# Patient Record
Sex: FEMALE | URBAN - METROPOLITAN AREA
[De-identification: names, ages, dates, MRNs, and addresses within clinical notes are randomized per-mention and may not be internally consistent; named-entity substitution may affect disease eponyms.]

---

## 2021-11-23 ENCOUNTER — HOSPITAL ENCOUNTER (EMERGENCY)
Facility: CLINIC | Age: 36
Discharge: LEFT BEFORE DISCHARGE | End: 2021-11-23

## 2021-11-23 VITALS
SYSTOLIC BLOOD PRESSURE: 137 MMHG | RESPIRATION RATE: 16 BRPM | WEIGHT: 210 LBS | HEIGHT: 67 IN | HEART RATE: 85 BPM | OXYGEN SATURATION: 99 % | DIASTOLIC BLOOD PRESSURE: 77 MMHG | BODY MASS INDEX: 32.96 KG/M2 | TEMPERATURE: 98.2 F

## 2021-11-23 ASSESSMENT — MIFFLIN-ST. JEOR: SCORE: 1680.18

## 2021-11-24 NOTE — ED TRIAGE NOTES
Patient reached into  to removed knife and the tip of the knife caused a small laceration to her right middle finger. Bleeding is controlled.

## 2022-01-27 ENCOUNTER — MEDICAL CORRESPONDENCE (OUTPATIENT)
Dept: HEALTH INFORMATION MANAGEMENT | Facility: CLINIC | Age: 37
End: 2022-01-27
Payer: COMMERCIAL

## 2022-02-11 ENCOUNTER — TRANSFERRED RECORDS (OUTPATIENT)
Dept: HEALTH INFORMATION MANAGEMENT | Facility: CLINIC | Age: 37
End: 2022-02-11
Payer: COMMERCIAL

## 2022-02-15 NOTE — TELEPHONE ENCOUNTER
RECORDS RECEIVED FROM: External   Appt Date: 02.16.2022   NOTES STATUS DETAILS   OFFICE NOTE from referring provider Received  02.15.2022 Shannon Bird DNP [facility]   MEDICATION LIST Care Everywhere  Received     02.15.2022 [facility]   LABS     HEPATIC PANEL (LIVER PANEL) Care Everywhere 12.03.2021   BASIC METABOLIC PANEL Care Everywhere 12.03.2021   COMPLETE METABOLIC PANEL Care Everywhere 03.11.2021   COMPLETE BLOOD COUNT (CBC) Care Everywhere 03.11.2021   HEPATITIS C ANTIBODY Care Everywhere 09.02.2020   HEPATITIS B SURFACE ANTIGEN Care Everywhere 09.02.2020   HEPATITIS B SURFACE ANTIBODY Care Everywhere 09.02.2020     Action 02.15.2022 RM   Action Taken Pending for records     Action 02.15.2022 rm 2:30p   Action Taken Records received,  sent to HIM to be uploaded to chart.

## 2022-02-16 ENCOUNTER — VIRTUAL VISIT (OUTPATIENT)
Dept: GASTROENTEROLOGY | Facility: CLINIC | Age: 37
End: 2022-02-16
Attending: STUDENT IN AN ORGANIZED HEALTH CARE EDUCATION/TRAINING PROGRAM
Payer: COMMERCIAL

## 2022-02-16 ENCOUNTER — PRE VISIT (OUTPATIENT)
Dept: GASTROENTEROLOGY | Facility: CLINIC | Age: 37
End: 2022-02-16
Payer: COMMERCIAL

## 2022-02-16 DIAGNOSIS — F15.10 METHAMPHETAMINE USE (H): ICD-10-CM

## 2022-02-16 DIAGNOSIS — E66.9 OBESITY WITH SERIOUS COMORBIDITY, UNSPECIFIED CLASSIFICATION, UNSPECIFIED OBESITY TYPE: ICD-10-CM

## 2022-02-16 DIAGNOSIS — R79.89 ELEVATED LFTS: Primary | ICD-10-CM

## 2022-02-16 DIAGNOSIS — Z79.4 TYPE 2 DIABETES MELLITUS WITHOUT COMPLICATION, WITH LONG-TERM CURRENT USE OF INSULIN (H): ICD-10-CM

## 2022-02-16 DIAGNOSIS — E11.9 TYPE 2 DIABETES MELLITUS WITHOUT COMPLICATION, WITH LONG-TERM CURRENT USE OF INSULIN (H): ICD-10-CM

## 2022-02-16 PROCEDURE — 99204 OFFICE O/P NEW MOD 45 MIN: CPT | Mod: 95 | Performed by: STUDENT IN AN ORGANIZED HEALTH CARE EDUCATION/TRAINING PROGRAM

## 2022-02-16 RX ORDER — DULOXETIN HYDROCHLORIDE 20 MG/1
20 CAPSULE, DELAYED RELEASE ORAL
COMMUNITY
Start: 2020-11-18

## 2022-02-16 RX ORDER — ACETAMINOPHEN 500 MG
500 TABLET ORAL
COMMUNITY
Start: 2021-03-11

## 2022-02-16 RX ORDER — MULTIVITAMIN,THERAPEUTIC
1 TABLET ORAL DAILY
COMMUNITY

## 2022-02-16 RX ORDER — LIRAGLUTIDE 6 MG/ML
1.2 INJECTION SUBCUTANEOUS DAILY
COMMUNITY

## 2022-02-16 RX ORDER — QUETIAPINE FUMARATE 50 MG/1
50 TABLET, FILM COATED ORAL 2 TIMES DAILY
COMMUNITY

## 2022-02-16 RX ORDER — INSULIN GLARGINE 100 [IU]/ML
INJECTION, SOLUTION SUBCUTANEOUS AT BEDTIME
COMMUNITY

## 2022-02-16 RX ORDER — IBUPROFEN 600 MG/1
600 TABLET, FILM COATED ORAL
COMMUNITY
Start: 2020-09-05

## 2022-02-16 ASSESSMENT — PAIN SCALES - GENERAL: PAINLEVEL: NO PAIN (0)

## 2022-02-16 NOTE — LETTER
Date:February 20, 2022      Patient was self referred, no letter generated. Do not send.        [facility] Health Information

## 2022-02-16 NOTE — LETTER
2/16/2022         RE: Mary Lopez  [address]  [place] [address]        Dear Colleague,    Thank you for referring your patient, Mary Lopez, to the [facility] [place]. Please see a copy of my visit note below.    Mary is a 36 year old who is being evaluated via a billable video visit.      How would you like to obtain your AVS? MyChart  If the video visit is dropped, the invitation should be resent by: Text to cell phone: [phone]  Will anyone else be joining your video visit? No      Video Start Time: 12:32 PM  Video-Visit Details    Type of service:  Video Visit    Video End Time:12:42    Originating Location (pt. Location): Home    Distant Location (provider location):  [facility] [place]     Platform used for Video Visit: [employer]      [facility] New Patient Visit    Date of Visit: February 16, 2022    Reason for referral: Elevated LFTs    Subjective: Ms. Lopez is a 36-year-old woman with a history of obesity, diabetes, prior methamphetamine use who presents for evaluation of elevated liver enzymes    She thinks her liver enzymes were in 2013, did a MRI That was normal    Has a history of moderate liver enzyme elevation on reviewing copper wire, hepatitis C testing was negative in 2020.    She has a several day history of methamphetamine use, smokes methamphetamines.  Denies IV or intranasal drug use.  She has been sober for about 2 3 weeks.  She notes that since she has become sober she has lost about 40 pounds.  A1c has been elevated around the 10s, will started Victoza and is working with her lose weight again.  She is working with the diabetes educator at [facility].    No alcohol. No family history of liver disease    ROS: 14 point ROS negative except for positives noted in HPI.    PMHx:  DM - last A1c 10.9  Methamphetamine use  Vaginal delivery    PSHx:  [place] teeth removal    FamHx:  No family  history of liver disease, liver cancer    SocHx:  Social History     Socioeconomic History     Marital status: Patient Declined     Spouse name: Not on file     Number of children: Not on file     Years of education: Not on file     Highest education level: Not on file   Occupational History     Not on file   Tobacco Use     Smoking status: Former Smoker     Smokeless tobacco: Never Used   Substance and Sexual Activity     Alcohol use: Not Currently     Drug use: Not Currently     Sexual activity: Not on file   Other Topics Concern     Not on file   Social History Narrative     Not on file     Social Determinants of Health     Financial Resource Strain: Not on file   Food Insecurity: Not on file   Transportation Needs: Not on file   Physical Activity: Not on file   Stress: Not on file   Social Connections: Not on file   Intimate Partner Violence: Not on file   Housing Stability: Not on file       Medications:  Current Outpatient Medications   Medication     acetaminophen (TYLENOL) 500 MG tablet     DULoxetine (CYMBALTA) 20 MG capsule     ibuprofen (ADVIL/MOTRIN) 600 MG tablet     insulin glargine (LANTUS VIAL) 100 UNIT/ML vial     liraglutide (VICTOZA) 18 MG/3ML solution     multivitamin, therapeutic (THERA-VIT) TABS tablet     QUEtiapine (SEROQUEL) 50 MG tablet     Vitamin D3 (CHOLECALCIFEROL) 125 MCG (5000 UT) tablet     No current facility-administered medications for this visit.     No OTCs, herbals    Allergies:  Allergies   Allergen Reactions     Sulfa Drugs Angioedema       Objective:  There were no vitals taken for this visit.  Constitutional: pleasant woman in NAD  Eyes: non icteric  Respiratory: Normal respiratory excursion   MSK: normal range of motion of visualized extremities  Abd: Non distended  Skin: No jaundice  Psychiatric: normal mood and orientation    Labs:    12/2021  AST 74  total bilirubin 0.2 alkaline phosphatase 177     Imaging: No liver imaging for review    Independently reviewed  labs and imaging.     Assessment/Plan: Ms. Lopez is a 36-year-old woman with a history of diabetes, obesity, former methamphetamine use, who presents for evaluation of elevated liver enzymes.  Recommend checking again for hepatitis B and C.  She ass negative in the past and denies IV or intranasal drug use in the meantime. Feel her elevated liver enzymes are likely related to nonalcoholic fatty liver disease and poorly controlled diabetes, she is working on this currently.  She does not drink alcohol.    Discussed the natural history of fatty liver, risk for progression to fibrosis with continued obesity and poorly controlled diabetes.  She is already taking Victoza, which has some benefits and nonalcohol fatty liver disease.    Recommend right upper quadrant ultrasound to assess her liver parenchyma, and serologic work-up for other causes of elevated liver enzymes  Orders Placed This Encounter   Procedures     US Abdomen Limited     Comprehensive metabolic panel (BMP + Alb, Alk Phos, ALT, AST, Total. Bili, TP)     Hepatitis C RNA, quantitative     Hepatitis C antibody     Hepatitis Antibody A IgG     Hepatitis A antibody IgM     Hepatitis B surface antigen     Hepatitis B core antibody     IgG     F Actin EIA with reflex     Iron and iron binding capacity     Ferritin       RTC 6 months.    Daina Marinelli MD MS  Hepatology/Liver Transplant  [facility]            Again, thank you for allowing me to participate in the care of your patient.        Sincerely,        Daina Marinelli MD

## 2022-02-16 NOTE — PROGRESS NOTES
Mary is a 36 year old who is being evaluated via a billable video visit.      How would you like to obtain your AVS? MyChart  If the video visit is dropped, the invitation should be resent by: Text to cell phone: [phone]  Will anyone else be joining your video visit? No      Video Start Time: 12:32 PM  Video-Visit Details    Type of service:  Video Visit    Video End Time:12:42    Originating Location (pt. Location): Home    Distant Location (provider location):  [facility] [place]     Platform used for Video Visit: [employer]      [facility] New Patient Visit    Date of Visit: February 16, 2022    Reason for referral: Elevated LFTs    Subjective: Ms. Lopez is a 36-year-old woman with a history of obesity, diabetes, prior methamphetamine use who presents for evaluation of elevated liver enzymes    She thinks her liver enzymes were in 2013, did a MRI That was normal    Has a history of moderate liver enzyme elevation on reviewing copper wire, hepatitis C testing was negative in 2020.    She has a several day history of methamphetamine use, smokes methamphetamines.  Denies IV or intranasal drug use.  She has been sober for about 2 3 weeks.  She notes that since she has become sober she has lost about 40 pounds.  A1c has been elevated around the 10s, will started Victoza and is working with her lose weight again.  She is working with the diabetes educator at [facility].    No alcohol. No family history of liver disease    ROS: 14 point ROS negative except for positives noted in HPI.    PMHx:  DM - last A1c 10.9  Methamphetamine use  Vaginal delivery    PSHx:  [place] teeth removal    FamHx:  No family history of liver disease, liver cancer    SocHx:  Social History     Socioeconomic History     Marital status: Patient Declined     Spouse name: Not on file     Number of children: Not on file     Years of education: Not on file     Highest education level: Not on  file   Occupational History     Not on file   Tobacco Use     Smoking status: Former Smoker     Smokeless tobacco: Never Used   Substance and Sexual Activity     Alcohol use: Not Currently     Drug use: Not Currently     Sexual activity: Not on file   Other Topics Concern     Not on file   Social History Narrative     Not on file     Social Determinants of Health     Financial Resource Strain: Not on file   Food Insecurity: Not on file   Transportation Needs: Not on file   Physical Activity: Not on file   Stress: Not on file   Social Connections: Not on file   Intimate Partner Violence: Not on file   Housing Stability: Not on file       Medications:  Current Outpatient Medications   Medication     acetaminophen (TYLENOL) 500 MG tablet     DULoxetine (CYMBALTA) 20 MG capsule     ibuprofen (ADVIL/MOTRIN) 600 MG tablet     insulin glargine (LANTUS VIAL) 100 UNIT/ML vial     liraglutide (VICTOZA) 18 MG/3ML solution     multivitamin, therapeutic (THERA-VIT) TABS tablet     QUEtiapine (SEROQUEL) 50 MG tablet     Vitamin D3 (CHOLECALCIFEROL) 125 MCG (5000 UT) tablet     No current facility-administered medications for this visit.     No OTCs, herbals    Allergies:  Allergies   Allergen Reactions     Sulfa Drugs Angioedema       Objective:  There were no vitals taken for this visit.  Constitutional: pleasant woman in NAD  Eyes: non icteric  Respiratory: Normal respiratory excursion   MSK: normal range of motion of visualized extremities  Abd: Non distended  Skin: No jaundice  Psychiatric: normal mood and orientation    Labs:    12/2021  AST 74  total bilirubin 0.2 alkaline phosphatase 177     Imaging: No liver imaging for review    Independently reviewed labs and imaging.     Assessment/Plan: Ms. Lopez is a 36-year-old woman with a history of diabetes, obesity, former methamphetamine use, who presents for evaluation of elevated liver enzymes.  Recommend checking again for hepatitis B and C.  She ass negative in the  past and denies IV or intranasal drug use in the meantime. Feel her elevated liver enzymes are likely related to nonalcoholic fatty liver disease and poorly controlled diabetes, she is working on this currently.  She does not drink alcohol.    Discussed the natural history of fatty liver, risk for progression to fibrosis with continued obesity and poorly controlled diabetes.  She is already taking Victoza, which has some benefits and nonalcohol fatty liver disease.    Recommend right upper quadrant ultrasound to assess her liver parenchyma, and serologic work-up for other causes of elevated liver enzymes  Orders Placed This Encounter   Procedures     US Abdomen Limited     Comprehensive metabolic panel (BMP + Alb, Alk Phos, ALT, AST, Total. Bili, TP)     Hepatitis C RNA, quantitative     Hepatitis C antibody     Hepatitis Antibody A IgG     Hepatitis A antibody IgM     Hepatitis B surface antigen     Hepatitis B core antibody     IgG     F Actin EIA with reflex     Iron and iron binding capacity     Ferritin       RTC 6 months.    Daina Marinelli MD MS  Hepatology/Liver Transplant  [facility]

## 2024-11-11 ENCOUNTER — HOSPITAL ENCOUNTER (EMERGENCY)
Facility: CLINIC | Age: 39
Discharge: HOME | End: 2024-11-11
Attending: STUDENT IN AN ORGANIZED HEALTH CARE EDUCATION/TRAINING PROGRAM | Admitting: STUDENT IN AN ORGANIZED HEALTH CARE EDUCATION/TRAINING PROGRAM
Payer: COMMERCIAL

## 2024-11-11 VITALS
TEMPERATURE: 98.1 F | HEIGHT: 67 IN | OXYGEN SATURATION: 100 % | SYSTOLIC BLOOD PRESSURE: 115 MMHG | WEIGHT: 198.8 LBS | DIASTOLIC BLOOD PRESSURE: 75 MMHG | HEART RATE: 69 BPM | BODY MASS INDEX: 31.2 KG/M2 | RESPIRATION RATE: 16 BRPM

## 2024-11-11 DIAGNOSIS — B96.89 BACTERIAL VAGINOSIS: ICD-10-CM

## 2024-11-11 DIAGNOSIS — N76.0 BACTERIAL VAGINOSIS: ICD-10-CM

## 2024-11-11 DIAGNOSIS — N30.00 ACUTE CYSTITIS WITHOUT HEMATURIA: ICD-10-CM

## 2024-11-11 LAB
ALBUMIN SERPL BCG-MCNC: 4.1 G/DL (ref 3.5–5.2)
ALBUMIN UR-MCNC: 20 MG/DL
ALP SERPL-CCNC: 114 U/L (ref 40–150)
ALT SERPL W P-5'-P-CCNC: 18 U/L (ref 0–50)
ANION GAP SERPL CALCULATED.3IONS-SCNC: 13 MMOL/L (ref 7–15)
APPEARANCE UR: ABNORMAL
AST SERPL W P-5'-P-CCNC: 16 U/L (ref 0–45)
BASOPHILS # BLD AUTO: 0 10E3/UL (ref 0–0.2)
BASOPHILS NFR BLD AUTO: 0 %
BILIRUB SERPL-MCNC: <0.2 MG/DL
BILIRUB UR QL STRIP: NEGATIVE
BUN SERPL-MCNC: 23.9 MG/DL (ref 6–20)
CALCIUM SERPL-MCNC: 8.7 MG/DL (ref 8.8–10.4)
CHLORIDE SERPL-SCNC: 103 MMOL/L (ref 98–107)
CLUE CELLS: PRESENT
COLOR UR AUTO: YELLOW
CREAT SERPL-MCNC: 0.71 MG/DL (ref 0.51–0.95)
EGFRCR SERPLBLD CKD-EPI 2021: >90 ML/MIN/1.73M2
EOSINOPHIL # BLD AUTO: 0.2 10E3/UL (ref 0–0.7)
EOSINOPHIL NFR BLD AUTO: 3 %
ERYTHROCYTE [DISTWIDTH] IN BLOOD BY AUTOMATED COUNT: 12 % (ref 10–15)
GLUCOSE SERPL-MCNC: 157 MG/DL (ref 70–99)
GLUCOSE UR STRIP-MCNC: NEGATIVE MG/DL
HCG UR QL: NEGATIVE
HCO3 SERPL-SCNC: 22 MMOL/L (ref 22–29)
HCT VFR BLD AUTO: 37.4 % (ref 35–47)
HGB BLD-MCNC: 12.7 G/DL (ref 11.7–15.7)
HGB UR QL STRIP: NEGATIVE
IMM GRANULOCYTES # BLD: 0 10E3/UL
IMM GRANULOCYTES NFR BLD: 0 %
INTERNAL QC OK POCT: NORMAL
KETONES UR STRIP-MCNC: NEGATIVE MG/DL
LEUKOCYTE ESTERASE UR QL STRIP: ABNORMAL
LYMPHOCYTES # BLD AUTO: 2.5 10E3/UL (ref 0.8–5.3)
LYMPHOCYTES NFR BLD AUTO: 30 %
MCH RBC QN AUTO: 28.7 PG (ref 26.5–33)
MCHC RBC AUTO-ENTMCNC: 34 G/DL (ref 31.5–36.5)
MCV RBC AUTO: 84 FL (ref 78–100)
MONOCYTES # BLD AUTO: 0.6 10E3/UL (ref 0–1.3)
MONOCYTES NFR BLD AUTO: 8 %
MUCOUS THREADS #/AREA URNS LPF: PRESENT /LPF
NEUTROPHILS # BLD AUTO: 5 10E3/UL (ref 1.6–8.3)
NEUTROPHILS NFR BLD AUTO: 60 %
NITRATE UR QL: NEGATIVE
NRBC # BLD AUTO: 0 10E3/UL
NRBC BLD AUTO-RTO: 0 /100
PH UR STRIP: 5.5 [PH] (ref 5–7)
PLATELET # BLD AUTO: 211 10E3/UL (ref 150–450)
POCT KIT EXPIRATION DATE: NORMAL
POCT KIT LOT NUMBER: NORMAL
POTASSIUM SERPL-SCNC: 3.9 MMOL/L (ref 3.4–5.3)
PROT SERPL-MCNC: 7.3 G/DL (ref 6.4–8.3)
RBC # BLD AUTO: 4.43 10E6/UL (ref 3.8–5.2)
RBC URINE: 9 /HPF
RENAL TUB EPI: <1 /HPF
SODIUM SERPL-SCNC: 138 MMOL/L (ref 135–145)
SP GR UR STRIP: 1.03 (ref 1–1.03)
SQUAMOUS EPITHELIAL: 1 /HPF
TRANSITIONAL EPI: <1 /HPF
TRICHOMONAS, WET PREP: ABNORMAL
UROBILINOGEN UR STRIP-MCNC: NORMAL MG/DL
WBC # BLD AUTO: 8.4 10E3/UL (ref 4–11)
WBC URINE: >182 /HPF
WBC'S/HIGH POWER FIELD, WET PREP: ABNORMAL
YEAST, WET PREP: ABNORMAL

## 2024-11-11 PROCEDURE — 99284 EMERGENCY DEPT VISIT MOD MDM: CPT | Performed by: STUDENT IN AN ORGANIZED HEALTH CARE EDUCATION/TRAINING PROGRAM

## 2024-11-11 PROCEDURE — 87086 URINE CULTURE/COLONY COUNT: CPT | Performed by: STUDENT IN AN ORGANIZED HEALTH CARE EDUCATION/TRAINING PROGRAM

## 2024-11-11 PROCEDURE — 81003 URINALYSIS AUTO W/O SCOPE: CPT | Performed by: STUDENT IN AN ORGANIZED HEALTH CARE EDUCATION/TRAINING PROGRAM

## 2024-11-11 PROCEDURE — 81025 URINE PREGNANCY TEST: CPT | Performed by: STUDENT IN AN ORGANIZED HEALTH CARE EDUCATION/TRAINING PROGRAM

## 2024-11-11 PROCEDURE — 36415 COLL VENOUS BLD VENIPUNCTURE: CPT | Performed by: STUDENT IN AN ORGANIZED HEALTH CARE EDUCATION/TRAINING PROGRAM

## 2024-11-11 PROCEDURE — 87210 SMEAR WET MOUNT SALINE/INK: CPT | Performed by: STUDENT IN AN ORGANIZED HEALTH CARE EDUCATION/TRAINING PROGRAM

## 2024-11-11 PROCEDURE — 85025 COMPLETE CBC W/AUTO DIFF WBC: CPT | Performed by: STUDENT IN AN ORGANIZED HEALTH CARE EDUCATION/TRAINING PROGRAM

## 2024-11-11 PROCEDURE — 82040 ASSAY OF SERUM ALBUMIN: CPT | Performed by: STUDENT IN AN ORGANIZED HEALTH CARE EDUCATION/TRAINING PROGRAM

## 2024-11-11 RX ORDER — NITROFURANTOIN 25; 75 MG/1; MG/1
100 CAPSULE ORAL 2 TIMES DAILY
Qty: 10 CAPSULE | Refills: 0 | Status: SHIPPED | OUTPATIENT
Start: 2024-11-11 | End: 2024-11-16

## 2024-11-11 RX ORDER — CLINDAMYCIN PHOSPHATE 20 MG/G
1 CREAM VAGINAL AT BEDTIME
Qty: 40 G | Refills: 0 | Status: SHIPPED | OUTPATIENT
Start: 2024-11-11 | End: 2024-11-18

## 2024-11-11 ASSESSMENT — ACTIVITIES OF DAILY LIVING (ADL)
ADLS_ACUITY_SCORE: 0

## 2024-11-11 ASSESSMENT — COLUMBIA-SUICIDE SEVERITY RATING SCALE - C-SSRS
6. HAVE YOU EVER DONE ANYTHING, STARTED TO DO ANYTHING, OR PREPARED TO DO ANYTHING TO END YOUR LIFE?: NO
2. HAVE YOU ACTUALLY HAD ANY THOUGHTS OF KILLING YOURSELF IN THE PAST MONTH?: NO
1. IN THE PAST MONTH, HAVE YOU WISHED YOU WERE DEAD OR WISHED YOU COULD GO TO SLEEP AND NOT WAKE UP?: NO

## 2024-11-11 NOTE — ED TRIAGE NOTES
Triage Assessment (Adult)       Row Name 11/11/24 1758          Triage Assessment    Airway WDL WDL        Respiratory WDL    Respiratory WDL WDL        Skin Circulation/Temperature WDL    Skin Circulation/Temperature WDL WDL        Cardiac WDL    Cardiac WDL WDL        Peripheral/Neurovascular WDL    Peripheral Neurovascular WDL WDL        Cognitive/Neuro/Behavioral WDL    Cognitive/Neuro/Behavioral WDL WDL

## 2024-11-12 NOTE — DISCHARGE INSTRUCTIONS
You were seen in the emergency department due to pain with urination and found to have a bladder infection.  You also at the same time do have bacterial vaginosis again.    For your bladder infection, it does not seem that during most of your recent evaluations you actually had an infection in your bladder at that point.  We will start you on an antibiotic by mouth for that (Macrobid).    For your bacterial vaginosis as you have had this multiple times before, we will move onto the more intense regimen for this which is actually a vaginal cream called vaginal clindamycin.  We would recommend you follow-up with your primary care doctor as you may need to be on twice weekly vaginal doses of metronidazole for a number of months after you complete your oral antibiotic therapy..  This is for when people have had multiple episodes and they have not resolved.    Return to the emergency department with any worsening severe abdominal pain, significant nausea and vomiting, or any other concerning symptoms

## 2024-11-12 NOTE — ED PROVIDER NOTES
[facility] EMERGENCY DEPARTMENT ([facility])    11/11/24      ED PROVIDER NOTE    History     Chief Complaint   Patient presents with    Cystitis     Pt has symptoms of UTI, frequency, burning with urination, history of UTI and BV     The history is provided by the patient and medical records.     Mary Lopez is a 39 year old female with a history of insulin-dependent type 2 diabetes on Wegovy, depression, recent UTI and BV presenting to the emergency department due to dysuria, urinary frequency and lower abdominal pain.    Patient notes that she has been having lower abdominal pain as well as nausea ever since she was started on Wegovy about 6 months ago.  She has been having increased frequency of urinary tract infections recently, was recently diagnosed with 1 as well as BV, and was completely treated with those.  Diabetes and depression who presents to the ED for evaluation of dysuria.    On external chart review, labs done at an outside facility on 10/11/2024 demonstrating evidence of bacteria on her wet prep as well as clue cells, negative GC at that time, urinalysis without evidence of infection.    She notes that she was treated with an oral medication at that time and based on the note it appears that she was diagnosed with BV, treated initially with oral metronidazole, and vaginal metronidazole.    Past Medical History  History reviewed. No pertinent past medical history.  History reviewed. No pertinent surgical history.  clindamycin (CLEOCIN) 2 % vaginal cream  nitroFURantoin macrocrystal-monohydrate (MACROBID) 100 MG capsule  acetaminophen (TYLENOL) 500 MG tablet  DULoxetine (CYMBALTA) 20 MG capsule  ibuprofen (ADVIL/MOTRIN) 600 MG tablet  insulin glargine (LANTUS VIAL) 100 UNIT/ML vial  liraglutide (VICTOZA) 18 MG/3ML solution  multivitamin, therapeutic (THERA-VIT) TABS tablet  QUEtiapine (SEROQUEL) 50 MG tablet  Vitamin D3 (CHOLECALCIFEROL) 125 MCG (5000 UT) tablet      Allergies  "  Allergen Reactions    Sulfa Antibiotics Angioedema     Family History  History reviewed. No pertinent family history.  Social History   Social History     Tobacco Use    Smoking status: Former    Smokeless tobacco: Never   Substance Use Topics    Alcohol use: Not Currently    Drug use: Not Currently      Past medical history, past surgical history, medications, allergies, family history, and social history were reviewed with the patient. No additional pertinent items.     A medically appropriate review of systems was performed with pertinent positives and negatives noted in the HPI, and all other systems negative.    Physical Exam   BP: 113/74  Pulse: 81  Temp: 98.2  F (36.8  C)  Resp: 16  Height: 170.2 cm (5' 7\")  Weight: 90.2 kg (198 lb 12.8 oz)  SpO2: 98 %  Physical Exam  GEN: Well appearing, non toxic, cooperative  HEENT: normocephalic and atraumatic, PERRLA, EOMI  CV: well-perfused, normal skin color for ethnicity, regular rate and rhythm  PULM: breathing comfortably, in no respiratory distress, clear to auscultation upper lower lung fields  ABD: nondistended, soft, mild suprapubic abdominal tenderness, negative Campo, negative Ramo point tenderness, nonperitonitic  : Deferred  EXT: Full range of motion.  No edema.  NEURO: awake, conversant, grossly normal bilateral upper and lower extremity strength & ROM   SKIN: No rashes, ecchymosis, or lacerations  PSYCH: Calm and cooperative, interactive    ED Course, Procedures, & Data      Procedures          Results for orders placed or performed during the hospital encounter of 11/11/24   UA with Microscopic reflex to Culture     Status: Abnormal    Specimen: Urine, Clean Catch   Result Value Ref Range    Color Urine Yellow Colorless, Straw, Light Yellow, Yellow    Appearance Urine Slightly Cloudy (A) Clear    Glucose Urine Negative Negative mg/dL    Bilirubin Urine Negative Negative    Ketones Urine Negative Negative mg/dL    Specific Gravity Urine 1.028 1.003 " - 1.035    Blood Urine Negative Negative    pH Urine 5.5 5.0 - 7.0    Protein Albumin Urine 20 (A) Negative mg/dL    Urobilinogen Urine Normal Normal, 2.0 mg/dL    Nitrite Urine Negative Negative    Leukocyte Esterase Urine Moderate (A) Negative    Mucus Urine Present (A) None Seen /LPF    RBC Urine 9 (H) <=2 /HPF    WBC Urine >182 (H) <=5 /HPF    Squamous Epithelials Urine 1 <=1 /HPF    Transitional Epithelials Urine <1 <=1 /HPF    Renal Tubular Epithelials Urine <1 None Seen /HPF    Narrative    Urine Culture ordered based on laboratory criteria   Comprehensive metabolic panel     Status: Abnormal   Result Value Ref Range    Sodium 138 135 - 145 mmol/L    Potassium 3.9 3.4 - 5.3 mmol/L    Carbon Dioxide (CO2) 22 22 - 29 mmol/L    Anion Gap 13 7 - 15 mmol/L    Urea Nitrogen 23.9 (H) 6.0 - 20.0 mg/dL    Creatinine 0.71 0.51 - 0.95 mg/dL    GFR Estimate >90 >60 mL/min/1.73m2    Calcium 8.7 (L) 8.8 - 10.4 mg/dL    Chloride 103 98 - 107 mmol/L    Glucose 157 (H) 70 - 99 mg/dL    Alkaline Phosphatase 114 40 - 150 U/L    AST 16 0 - 45 U/L    ALT 18 0 - 50 U/L    Protein Total 7.3 6.4 - 8.3 g/dL    Albumin 4.1 3.5 - 5.2 g/dL    Bilirubin Total <0.2 <=1.2 mg/dL   CBC with platelets and differential     Status: None   Result Value Ref Range    WBC Count 8.4 4.0 - 11.0 10e3/uL    RBC Count 4.43 3.80 - 5.20 10e6/uL    Hemoglobin 12.7 11.7 - 15.7 g/dL    Hematocrit 37.4 35.0 - 47.0 %    MCV 84 78 - 100 fL    MCH 28.7 26.5 - 33.0 pg    MCHC 34.0 31.5 - 36.5 g/dL    RDW 12.0 10.0 - 15.0 %    Platelet Count 211 150 - 450 10e3/uL    % Neutrophils 60 %    % Lymphocytes 30 %    % Monocytes 8 %    % Eosinophils 3 %    % Basophils 0 %    % Immature Granulocytes 0 %    NRBCs per 100 WBC 0 <1 /100    Absolute Neutrophils 5.0 1.6 - 8.3 10e3/uL    Absolute Lymphocytes 2.5 0.8 - 5.3 10e3/uL    Absolute Monocytes 0.6 0.0 - 1.3 10e3/uL    Absolute Eosinophils 0.2 0.0 - 0.7 10e3/uL    Absolute Basophils 0.0 0.0 - 0.2 10e3/uL    Absolute  Immature Granulocytes 0.0 <=0.4 10e3/uL    Absolute NRBCs 0.0 10e3/uL   hCG qual urine POCT     Status: Normal   Result Value Ref Range    HCG Qual Urine Negative Negative    Internal QC Check POCT Valid Valid    POCT Kit Lot Number [id number]     POCT Kit Expiration Date 03/26/26    Wet prep     Status: Abnormal    Specimen: Vagina; Swab   Result Value Ref Range    Trichomonas Absent Absent    Yeast Absent Absent    Clue Cells Present (A) Absent    WBCs/high power field None None   CBC with platelets differential     Status: None    Narrative    The following orders were created for panel order CBC with platelets differential.  Procedure                               Abnormality         Status                     ---------                               -----------         ------                     CBC with platelets and d...[[id number]]                      Final result                 Please view results for these tests on the individual orders.     Medications - No data to display  Labs Ordered and Resulted from Time of ED Arrival to Time of ED Departure   ROUTINE UA WITH MICROSCOPIC REFLEX TO CULTURE - Abnormal       Result Value    Color Urine Yellow      Appearance Urine Slightly Cloudy (*)     Glucose Urine Negative      Bilirubin Urine Negative      Ketones Urine Negative      Specific Gravity Urine 1.028      Blood Urine Negative      pH Urine 5.5      Protein Albumin Urine 20 (*)     Urobilinogen Urine Normal      Nitrite Urine Negative      Leukocyte Esterase Urine Moderate (*)     Mucus Urine Present (*)     RBC Urine 9 (*)     WBC Urine >182 (*)     Squamous Epithelials Urine 1      Transitional Epithelials Urine <1      Renal Tubular Epithelials Urine <1     COMPREHENSIVE METABOLIC PANEL - Abnormal    Sodium 138      Potassium 3.9      Carbon Dioxide (CO2) 22      Anion Gap 13      Urea Nitrogen 23.9 (*)     Creatinine 0.71      GFR Estimate >90      Calcium 8.7 (*)     Chloride 103      Glucose 157 (*)      Alkaline Phosphatase 114      AST 16      ALT 18      Protein Total 7.3      Albumin 4.1      Bilirubin Total <0.2     WET PREPARATION - Abnormal    Trichomonas Absent      Yeast Absent      Clue Cells Present (*)     WBCs/high power field None     HCG QUALITATIVE URINE POCT - Normal    HCG Qual Urine Negative      Internal QC Check POCT Valid      POCT Kit Lot Number [id number]      POCT Kit Expiration Date 03/26/26     CBC WITH PLATELETS AND DIFFERENTIAL    WBC Count 8.4      RBC Count 4.43      Hemoglobin 12.7      Hematocrit 37.4      MCV 84      MCH 28.7      MCHC 34.0      RDW 12.0      Platelet Count 211      % Neutrophils 60      % Lymphocytes 30      % Monocytes 8      % Eosinophils 3      % Basophils 0      % Immature Granulocytes 0      NRBCs per 100 WBC 0      Absolute Neutrophils 5.0      Absolute Lymphocytes 2.5      Absolute Monocytes 0.6      Absolute Eosinophils 0.2      Absolute Basophils 0.0      Absolute Immature Granulocytes 0.0      Absolute NRBCs 0.0     CHLAMYDIA TRACHOMATIS/NEISSERIA GONORRHOEAE BY PCR   URINE CULTURE     No orders to display          Critical care was not performed.     Medical Decision Making  The patient's presentation was of moderate complexity (an acute illness with systemic symptoms).    The patient's evaluation involved:  review of external note(s) from 3+ sources (see separate area of note for details)  review of 3+ test result(s) ordered prior to this encounter (see separate area of note for details)  ordering and/or review of 3+ test(s) in this encounter (see separate area of note for details)    The patient's management necessitated moderate risk (prescription drug management including medications given in the ED).    Assessment & Plan    39-year-old female with a past medical history of diabetes on Wegovy, recently diagnosed BV status post 2 rounds of treatment presenting to the emergency department due to recurrent dysuria and urinary frequency that she states  feels similar to what she was recently diagnosed with.  She thought it was a bladder infection, however based on previous chart review it seems that it was BV both times.  Patient denies any significant vaginal discharge or bleeding.  Will plan for self swab, urinalysis and pregnancy as well as lab work to ensure no evidence of significant uncontrolled diabetes contributing to her frequency of BV.  Abdominal pain in her lower abdomen is similar to her baseline over the 6 months according to the patient.  No significant difference.  Considered CT or further imaging, however patient is fairly convinced that this is not related to her symptoms today    8:17 PM lab work consistent with UTI as well as BV.  This is her third episode of BV despite treatment with oral and vaginal metronidazole.  Will get a prescription for intravaginal clindamycin, however she will need to follow-up with her PCP about possible maintenance intravaginal metronidazole dosing going forward.    I have reviewed the nursing notes. I have reviewed the findings, diagnosis, plan and need for follow up with the patient.    New Prescriptions    CLINDAMYCIN (CLEOCIN) 2 % VAGINAL CREAM    Place 1 applicator vaginally at bedtime for 7 days.    NITROFURANTOIN MACROCRYSTAL-MONOHYDRATE (MACROBID) 100 MG CAPSULE    Take 1 capsule (100 mg) by mouth 2 times daily for 5 days.       Final diagnoses:   Bacterial vaginosis   Acute cystitis without hematuria       Zaira Birch MD  [facility] EMERGENCY DEPARTMENT  11/11/2024     Zaira Birch MD  11/11/24 2018

## 2024-11-13 LAB — BACTERIA UR CULT: NORMAL

## 2025-01-12 ENCOUNTER — HOSPITAL ENCOUNTER (EMERGENCY)
Facility: CLINIC | Age: 40
Discharge: HOME | End: 2025-01-12
Attending: EMERGENCY MEDICINE
Payer: COMMERCIAL

## 2025-01-12 VITALS
BODY MASS INDEX: 31.83 KG/M2 | WEIGHT: 210 LBS | DIASTOLIC BLOOD PRESSURE: 76 MMHG | HEIGHT: 68 IN | SYSTOLIC BLOOD PRESSURE: 123 MMHG | RESPIRATION RATE: 16 BRPM | TEMPERATURE: 97.9 F | HEART RATE: 78 BPM | OXYGEN SATURATION: 99 %

## 2025-01-12 DIAGNOSIS — J06.9 UPPER RESPIRATORY TRACT INFECTION, UNSPECIFIED TYPE: ICD-10-CM

## 2025-01-12 DIAGNOSIS — U07.1 COVID-19 VIRUS INFECTION: ICD-10-CM

## 2025-01-12 LAB
FLUAV RNA SPEC QL NAA+PROBE: NEGATIVE
FLUBV RNA RESP QL NAA+PROBE: NEGATIVE
RSV RNA SPEC NAA+PROBE: POSITIVE
S PYO DNA THROAT QL NAA+PROBE: NOT DETECTED
SARS-COV-2 RNA RESP QL NAA+PROBE: NEGATIVE

## 2025-01-12 PROCEDURE — 99283 EMERGENCY DEPT VISIT LOW MDM: CPT | Performed by: EMERGENCY MEDICINE

## 2025-01-12 PROCEDURE — 87651 STREP A DNA AMP PROBE: CPT | Performed by: EMERGENCY MEDICINE

## 2025-01-12 PROCEDURE — 250N000013 HC RX MED GY IP 250 OP 250 PS 637: Performed by: EMERGENCY MEDICINE

## 2025-01-12 PROCEDURE — 99284 EMERGENCY DEPT VISIT MOD MDM: CPT | Performed by: EMERGENCY MEDICINE

## 2025-01-12 PROCEDURE — 87637 SARSCOV2&INF A&B&RSV AMP PRB: CPT | Performed by: EMERGENCY MEDICINE

## 2025-01-12 RX ORDER — NAPROXEN 500 MG/1
500 TABLET ORAL ONCE
Status: COMPLETED | OUTPATIENT
Start: 2025-01-12 | End: 2025-01-12

## 2025-01-12 RX ADMIN — NAPROXEN 500 MG: 500 TABLET ORAL at 17:59

## 2025-01-12 ASSESSMENT — COLUMBIA-SUICIDE SEVERITY RATING SCALE - C-SSRS
1. IN THE PAST MONTH, HAVE YOU WISHED YOU WERE DEAD OR WISHED YOU COULD GO TO SLEEP AND NOT WAKE UP?: NO
6. HAVE YOU EVER DONE ANYTHING, STARTED TO DO ANYTHING, OR PREPARED TO DO ANYTHING TO END YOUR LIFE?: NO
2. HAVE YOU ACTUALLY HAD ANY THOUGHTS OF KILLING YOURSELF IN THE PAST MONTH?: NO

## 2025-01-12 NOTE — Clinical Note
Mary Lopez was seen and treated in our emergency department on 1/12/2025.  She may return to work on 01/15/2025.       If you have any questions or concerns, please don't hesitate to call.      Von Hughes MD

## 2025-01-12 NOTE — ED PROVIDER NOTES
"    [facility] EMERGENCY DEPARTMENT ([facility])    1/12/25      ED PROVIDER NOTE    History     Chief Complaint   Patient presents with    Pharyngitis     Sore throat since thursday    Cold Symptoms     Runny nose, sneezing, cough.      The history is provided by the patient and medical records.     Mary Lopez is a 39 year old female with diabetes on [facility] presents with 3 days of an upper respiratory tract infection including sneezing, coughing, mild headache, sore throat.  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  Patient took NyQuil equivalent 2 hours prior to arrival.    This part of the medical record was transcribed by Laurel Molina Medical Scribe, from a dictation done by Von Hughes MD.     Physical Exam   BP: 123/76  Pulse: 78  Temp: 97.9  F (36.6  C)  Resp: 16  Height: 172.7 cm (5' 8\")  Weight: 95.3 kg (210 lb)  SpO2: 99 %    Physical Exam  overall no acute distress   occasionally coughing with stuffy nose   oral pharynx with uvula midline   tongue normal   no tonsillar exudates or tenderness in anterior neck   lungs are clear without wheezes     ED Course, Procedures, & Data      Procedures                Results for orders placed or performed during the hospital encounter of 01/12/25   Influenza A/B, RSV and SARS-CoV2 PCR (COVID-19) Nasopharyngeal     Status: Abnormal    Specimen: Nasopharyngeal; Swab   Result Value Ref Range    Influenza A PCR Negative Negative    Influenza B PCR Negative Negative    RSV PCR Positive (A) Negative    SARS CoV2 PCR Negative Negative    Narrative    Testing was performed using the Xpert Xpress CoV2/Flu/RSV Assay on the [employer] GeneXpert Instrument. This test should be ordered for the detection of SARS-CoV2, influenza, and RSV viruses in individuals with signs and symptoms of respiratory tract infection. This test is for in vitro diagnostic use under the " [place] FDA for laboratories certified under CLIA to perform high or moderate complexity testing. This test has been [place] FDA cleared. A negative result does not rule out the presence of PCR inhibitors in the specimen or target RNA in concentration below the limit of detection for the assay. If only one viral target is positive but coinfection with multiple targets is suspected, the sample should be re-tested with another FDA cleared, approved, or authorized test, if coninfection would change clinical management. This test was validated by the [facility]. These laboratories are certified under the Clinical Laboratory Improvement Amendments of 1988 (CLIA-88) as qualified to perfom high complexity laboratory testing.   Group A Streptococcus PCR Throat Swab     Status: Normal    Specimen: Throat; Swab   Result Value Ref Range    Group A strep by PCR Not Detected Not Detected    Narrative    The Xpert Xpress Strep A test, performed on the [employer], is a rapid, qualitative in vitro diagnostic test for the detection of Streptococcus pyogenes (Group A ß-hemolytic Streptococcus, Strep A) in throat swab specimens from patients with signs and symptoms of pharyngitis. The Xpert Xpress Strep A test can be used as an aid in the diagnosis of Group A Streptococcal pharyngitis. The assay is not intended to monitor treatment for Group A Streptococcus infections. The Xpert Xpress Strep A test utilizes an automated real-time polymerase chain reaction (PCR) to detect Streptococcus pyogenes DNA.     Medications   naproxen (NAPROSYN) tablet 500 mg (500 mg Oral $Given 1/12/25 3806)     Labs Ordered and Resulted from Time of ED Arrival to Time of ED Departure - No data to display  No orders to display          Critical care was not performed.     Medical Decision Making  The patient's presentation was of moderate complexity (an acute illness with systemic symptoms).    The patient's evaluation  involved:  history and exam without other MDM data elements    The patient's management necessitated moderate risk (prescription drug management including medications given in the ED).    Assessment & Plan    Viral syndrome.  Will treat with NSAIDs to add to her over-the-counter cold medicines.  Encouraged primary care follow-up, sleep, return precautions, and provided work note.    I have reviewed the nursing notes. I have reviewed the findings, diagnosis, plan and need for follow up with the patient.    This part of the medical record was transcribed by Laurel Molina Medical Scribe, from a dictation done by Luis E Hughes MD.     Discharge Medication List as of 1/12/2025  5:57 PM          Final diagnoses:   Upper respiratory tract infection, unspecified type   COVID-19 virus infection       Von Hughes MD  [facility] EMERGENCY DEPARTMENT  1/12/2025     Von Hughes MD  01/12/25 0358

## 2025-01-12 NOTE — ED TRIAGE NOTES
Patient denies any fever or chills.      Triage Assessment (Adult)       Row Name 01/12/25 1760          Triage Assessment    Airway WDL WDL        Respiratory WDL    Respiratory WDL WDL        Skin Circulation/Temperature WDL    Skin Circulation/Temperature WDL WDL        Cardiac WDL    Cardiac WDL WDL        Peripheral/Neurovascular WDL    Peripheral Neurovascular WDL WDL        Cognitive/Neuro/Behavioral WDL    Cognitive/Neuro/Behavioral WDL WDL

## 2025-01-12 NOTE — DISCHARGE INSTRUCTIONS
For pain, please take 975-1000mg acetaminophen (tylenol) every 8 hours -- do not take more than 3000mg in a 24 hour period.    You can take 500mg naproxen (aleve) every 12 hours for pain  Get plenty of sleep and wear a mask when you're around others until you stop coughing/sneezing  Please call today to schedule a follow-up appointment with your primary medical doctor in 3-5 days for reevaluation, which is important after today's emergency department visit.   Please return to emergency department for fever>104F, persistent vomiting, worsening chest pain, shortness of breath